# Patient Record
Sex: MALE | Race: WHITE | NOT HISPANIC OR LATINO
[De-identification: names, ages, dates, MRNs, and addresses within clinical notes are randomized per-mention and may not be internally consistent; named-entity substitution may affect disease eponyms.]

---

## 2017-01-03 ENCOUNTER — APPOINTMENT (OUTPATIENT)
Dept: CT IMAGING | Facility: CLINIC | Age: 57
End: 2017-01-03

## 2017-01-03 ENCOUNTER — OUTPATIENT (OUTPATIENT)
Dept: OUTPATIENT SERVICES | Facility: HOSPITAL | Age: 57
LOS: 1 days | End: 2017-01-03
Payer: COMMERCIAL

## 2017-01-03 DIAGNOSIS — Z00.8 ENCOUNTER FOR OTHER GENERAL EXAMINATION: ICD-10-CM

## 2017-01-03 PROCEDURE — 82565 ASSAY OF CREATININE: CPT

## 2017-01-03 PROCEDURE — 74178 CT ABD&PLV WO CNTR FLWD CNTR: CPT

## 2017-01-10 ENCOUNTER — APPOINTMENT (OUTPATIENT)
Dept: UROLOGY | Facility: CLINIC | Age: 57
End: 2017-01-10

## 2017-01-10 DIAGNOSIS — R30.0 DYSURIA: ICD-10-CM

## 2017-01-10 RX ORDER — TERAZOSIN 5 MG/1
5 CAPSULE ORAL
Qty: 90 | Refills: 3 | Status: ACTIVE | COMMUNITY
Start: 2017-01-10 | End: 1900-01-01

## 2017-01-17 ENCOUNTER — APPOINTMENT (OUTPATIENT)
Dept: UROLOGY | Facility: CLINIC | Age: 57
End: 2017-01-17

## 2017-01-17 ENCOUNTER — OUTPATIENT (OUTPATIENT)
Dept: OUTPATIENT SERVICES | Facility: HOSPITAL | Age: 57
LOS: 1 days | End: 2017-01-17
Payer: COMMERCIAL

## 2017-01-17 ENCOUNTER — APPOINTMENT (OUTPATIENT)
Dept: RADIOLOGY | Facility: CLINIC | Age: 57
End: 2017-01-17

## 2017-01-17 DIAGNOSIS — Z00.8 ENCOUNTER FOR OTHER GENERAL EXAMINATION: ICD-10-CM

## 2017-01-17 PROCEDURE — 74018 RADEX ABDOMEN 1 VIEW: CPT

## 2017-01-19 RX ORDER — ALLOPURINOL 100 MG/1
100 TABLET ORAL
Qty: 30 | Refills: 0 | Status: ACTIVE | COMMUNITY
Start: 2017-01-19 | End: 1900-01-01

## 2017-01-19 RX ORDER — POTASSIUM CITRATE AND CITRIC ACID 3.3; 1.002 G/1; G/1
3300-1002 GRANULE, FOR SOLUTION ORAL
Qty: 28 | Refills: 0 | Status: ACTIVE | COMMUNITY
Start: 2017-01-19 | End: 1900-01-01

## 2017-02-06 ENCOUNTER — APPOINTMENT (OUTPATIENT)
Dept: ULTRASOUND IMAGING | Facility: CLINIC | Age: 57
End: 2017-02-06

## 2017-02-06 ENCOUNTER — OUTPATIENT (OUTPATIENT)
Dept: OUTPATIENT SERVICES | Facility: HOSPITAL | Age: 57
LOS: 1 days | End: 2017-02-06
Payer: COMMERCIAL

## 2017-02-06 DIAGNOSIS — Z00.8 ENCOUNTER FOR OTHER GENERAL EXAMINATION: ICD-10-CM

## 2017-02-06 PROCEDURE — 76775 US EXAM ABDO BACK WALL LIM: CPT

## 2017-03-16 ENCOUNTER — APPOINTMENT (OUTPATIENT)
Dept: UROLOGY | Facility: CLINIC | Age: 57
End: 2017-03-16

## 2017-03-16 VITALS
SYSTOLIC BLOOD PRESSURE: 140 MMHG | HEIGHT: 72 IN | WEIGHT: 245 LBS | TEMPERATURE: 98 F | BODY MASS INDEX: 33.18 KG/M2 | OXYGEN SATURATION: 96 % | HEART RATE: 63 BPM | DIASTOLIC BLOOD PRESSURE: 79 MMHG

## 2017-09-21 ENCOUNTER — APPOINTMENT (OUTPATIENT)
Dept: CT IMAGING | Facility: CLINIC | Age: 57
End: 2017-09-21
Payer: COMMERCIAL

## 2017-09-21 ENCOUNTER — OUTPATIENT (OUTPATIENT)
Dept: OUTPATIENT SERVICES | Facility: HOSPITAL | Age: 57
LOS: 1 days | End: 2017-09-21
Payer: COMMERCIAL

## 2017-09-21 DIAGNOSIS — Z00.8 ENCOUNTER FOR OTHER GENERAL EXAMINATION: ICD-10-CM

## 2017-09-21 PROCEDURE — 74176 CT ABD & PELVIS W/O CONTRAST: CPT | Mod: 26

## 2017-09-21 PROCEDURE — 74176 CT ABD & PELVIS W/O CONTRAST: CPT

## 2018-01-15 ENCOUNTER — OUTPATIENT (OUTPATIENT)
Dept: OUTPATIENT SERVICES | Facility: HOSPITAL | Age: 58
LOS: 1 days | End: 2018-01-15
Payer: COMMERCIAL

## 2018-01-15 ENCOUNTER — APPOINTMENT (OUTPATIENT)
Dept: MRI IMAGING | Facility: CLINIC | Age: 58
End: 2018-01-15

## 2018-01-15 DIAGNOSIS — Z00.8 ENCOUNTER FOR OTHER GENERAL EXAMINATION: ICD-10-CM

## 2018-01-15 PROCEDURE — 73221 MRI JOINT UPR EXTREM W/O DYE: CPT

## 2018-01-15 PROCEDURE — 73221 MRI JOINT UPR EXTREM W/O DYE: CPT | Mod: 26,LT

## 2018-10-11 ENCOUNTER — OUTPATIENT (OUTPATIENT)
Dept: OUTPATIENT SERVICES | Facility: HOSPITAL | Age: 58
LOS: 1 days | End: 2018-10-11
Payer: COMMERCIAL

## 2018-10-11 ENCOUNTER — APPOINTMENT (OUTPATIENT)
Dept: CT IMAGING | Facility: CLINIC | Age: 58
End: 2018-10-11
Payer: COMMERCIAL

## 2018-10-11 DIAGNOSIS — Z00.8 ENCOUNTER FOR OTHER GENERAL EXAMINATION: ICD-10-CM

## 2018-10-11 PROCEDURE — 74176 CT ABD & PELVIS W/O CONTRAST: CPT | Mod: 26

## 2018-10-11 PROCEDURE — 74176 CT ABD & PELVIS W/O CONTRAST: CPT

## 2019-02-12 ENCOUNTER — APPOINTMENT (OUTPATIENT)
Dept: GASTROENTEROLOGY | Facility: CLINIC | Age: 59
End: 2019-02-12
Payer: COMMERCIAL

## 2019-02-12 VITALS
HEIGHT: 72 IN | TEMPERATURE: 97.9 F | OXYGEN SATURATION: 99 % | BODY MASS INDEX: 28.71 KG/M2 | SYSTOLIC BLOOD PRESSURE: 130 MMHG | WEIGHT: 212 LBS | HEART RATE: 53 BPM | RESPIRATION RATE: 16 BRPM | DIASTOLIC BLOOD PRESSURE: 66 MMHG

## 2019-02-12 DIAGNOSIS — K57.30 DIVERTICULOSIS OF LARGE INTESTINE W/OUT PERFORATION OR ABSCESS W/OUT BLEEDING: ICD-10-CM

## 2019-02-12 DIAGNOSIS — Z78.9 OTHER SPECIFIED HEALTH STATUS: ICD-10-CM

## 2019-02-12 DIAGNOSIS — R10.12 LEFT UPPER QUADRANT PAIN: ICD-10-CM

## 2019-02-12 PROCEDURE — 99244 OFF/OP CNSLTJ NEW/EST MOD 40: CPT

## 2019-02-12 RX ORDER — ALLOPURINOL 100 MG/1
100 TABLET ORAL DAILY
Qty: 90 | Refills: 3 | Status: DISCONTINUED | COMMUNITY
Start: 2017-02-21 | End: 2019-02-12

## 2019-02-12 NOTE — ASSESSMENT
[FreeTextEntry1] : 58-year-old male, left lower quadrant abdominal pains following bowel movements.\par Improvement with dicyclomine\par Improvement when he uses stool softener, MiraLax, following bowel cleanse associated with colonoscopy\par \par Functional abdominal pain, mild spasm likely\par \par Plan\par Above discussed in detail with patient.\par Expresses good understanding of my clinical impression\par No further diagnostic testing needed at this time\par Recommend MiraLax daily\par \par Patient referred by Dr Cameron Connell\par Follow up as needed

## 2019-02-12 NOTE — HISTORY OF PRESENT ILLNESS
[de-identified] : 58-year-old male presents for evaluation of intermittent left lower quadrant abdominal pains. Pain is typically following bowel movements, beginning 10 minutes later, often lasting throughout the day. Patient had recent colonoscopy with random biopsy showing only diverticulosis. Unremarkable CAT scan of the abdomen and pelvis reported. Patient reported therapeutic trial of Metamucil and Citrucel, each of which provided large well-formed bowel movements, but no significant improvement of his pain after defecation. Patient did notice significant improvement of his pain in the days following his bowel preparation for colonoscopy, and has generally had good results with using MiraLax every other day, though it does make his bowel movements narrower.  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

## 2019-02-12 NOTE — PHYSICAL EXAM
[General Appearance - Alert] : alert [General Appearance - In No Acute Distress] : in no acute distress [Sclera] : the sclera and conjunctiva were normal [PERRL With Normal Accommodation] : pupils were equal in size, round, and reactive to light [Extraocular Movements] : extraocular movements were intact [Outer Ear] : the ears and nose were normal in appearance [Oropharynx] : the oropharynx was normal [Neck Appearance] : the appearance of the neck was normal [Neck Cervical Mass (___cm)] : no neck mass was observed [Jugular Venous Distention Increased] : there was no jugular-venous distention [Thyroid Diffuse Enlargement] : the thyroid was not enlarged [Thyroid Nodule] : there were no palpable thyroid nodules [Auscultation Breath Sounds / Voice Sounds] : lungs were clear to auscultation bilaterally [Heart Rate And Rhythm] : heart rate was normal and rhythm regular [Heart Sounds] : normal S1 and S2 [Heart Sounds Gallop] : no gallops [Murmurs] : no murmurs [Heart Sounds Pericardial Friction Rub] : no pericardial rub [Edema] : there was no peripheral edema [Bowel Sounds] : normal bowel sounds [Abdomen Soft] : soft [Abdomen Tenderness] : non-tender [Abdomen Mass (___ Cm)] : no abdominal mass palpated [Cervical Lymph Nodes Enlarged Posterior Bilaterally] : posterior cervical [Cervical Lymph Nodes Enlarged Anterior Bilaterally] : anterior cervical [Supraclavicular Lymph Nodes Enlarged Bilaterally] : supraclavicular [Axillary Lymph Nodes Enlarged Bilaterally] : axillary [Femoral Lymph Nodes Enlarged Bilaterally] : femoral [Inguinal Lymph Nodes Enlarged Bilaterally] : inguinal [No CVA Tenderness] : no ~M costovertebral angle tenderness [No Spinal Tenderness] : no spinal tenderness [Abnormal Walk] : normal gait [Nail Clubbing] : no clubbing  or cyanosis of the fingernails [Musculoskeletal - Swelling] : no joint swelling seen [Motor Tone] : muscle strength and tone were normal [Skin Color & Pigmentation] : normal skin color and pigmentation [Skin Turgor] : normal skin turgor [] : no rash [Oriented To Time, Place, And Person] : oriented to person, place, and time [Impaired Insight] : insight and judgment were intact [Affect] : the affect was normal

## 2019-03-15 ENCOUNTER — APPOINTMENT (OUTPATIENT)
Age: 59
End: 2019-03-15
Payer: COMMERCIAL

## 2019-03-15 VITALS
WEIGHT: 212 LBS | BODY MASS INDEX: 28.71 KG/M2 | OXYGEN SATURATION: 98 % | HEIGHT: 72 IN | HEART RATE: 57 BPM | DIASTOLIC BLOOD PRESSURE: 80 MMHG | SYSTOLIC BLOOD PRESSURE: 130 MMHG

## 2019-03-15 DIAGNOSIS — N13.8 BENIGN PROSTATIC HYPERPLASIA WITH LOWER URINARY TRACT SYMPMS: ICD-10-CM

## 2019-03-15 DIAGNOSIS — N40.1 BENIGN PROSTATIC HYPERPLASIA WITH LOWER URINARY TRACT SYMPMS: ICD-10-CM

## 2019-03-15 DIAGNOSIS — Z87.442 PERSONAL HISTORY OF URINARY CALCULI: ICD-10-CM

## 2019-03-15 PROCEDURE — 99213 OFFICE O/P EST LOW 20 MIN: CPT

## 2019-03-15 RX ORDER — TERAZOSIN 5 MG/1
5 CAPSULE ORAL
Qty: 14 | Refills: 0 | Status: ACTIVE | OUTPATIENT
Start: 2019-03-15

## 2019-03-15 NOTE — END OF VISIT
[FreeTextEntry3] : He will trial terazosin and fall with a transrectal ultrasound and prostate gland. A KUB x-ray done to screen for cystoscopy

## 2019-03-15 NOTE — HISTORY OF PRESENT ILLNESS
[FreeTextEntry1] : This patient presents for regular checkup and has noted lower tract symptoms. He also has a history of urinary tract stones checked upon in some time.

## 2019-04-08 ENCOUNTER — APPOINTMENT (OUTPATIENT)
Dept: ULTRASOUND IMAGING | Facility: CLINIC | Age: 59
End: 2019-04-08

## 2019-04-08 ENCOUNTER — APPOINTMENT (OUTPATIENT)
Dept: CT IMAGING | Facility: CLINIC | Age: 59
End: 2019-04-08

## 2019-04-09 ENCOUNTER — APPOINTMENT (OUTPATIENT)
Dept: UROLOGY | Facility: CLINIC | Age: 59
End: 2019-04-09

## 2019-08-12 ENCOUNTER — APPOINTMENT (OUTPATIENT)
Dept: OTOLARYNGOLOGY | Facility: CLINIC | Age: 59
End: 2019-08-12
Payer: COMMERCIAL

## 2019-08-12 VITALS
HEART RATE: 52 BPM | HEIGHT: 72 IN | BODY MASS INDEX: 28.71 KG/M2 | SYSTOLIC BLOOD PRESSURE: 143 MMHG | WEIGHT: 212 LBS | DIASTOLIC BLOOD PRESSURE: 90 MMHG

## 2019-08-12 DIAGNOSIS — H90.3 SENSORINEURAL HEARING LOSS, BILATERAL: ICD-10-CM

## 2019-08-12 DIAGNOSIS — H61.813 EXOSTOSIS OF EXTERNAL CANAL, BILATERAL: ICD-10-CM

## 2019-08-12 DIAGNOSIS — J34.2 DEVIATED NASAL SEPTUM: ICD-10-CM

## 2019-08-12 DIAGNOSIS — H69.82 OTHER SPECIFIED DISORDERS OF EUSTACHIAN TUBE, LEFT EAR: ICD-10-CM

## 2019-08-12 DIAGNOSIS — H93.12 TINNITUS, LEFT EAR: ICD-10-CM

## 2019-08-12 PROCEDURE — 31231 NASAL ENDOSCOPY DX: CPT

## 2019-08-12 PROCEDURE — 92557 COMPREHENSIVE HEARING TEST: CPT

## 2019-08-12 PROCEDURE — 99204 OFFICE O/P NEW MOD 45 MIN: CPT | Mod: 25

## 2019-08-12 PROCEDURE — 92567 TYMPANOMETRY: CPT

## 2019-08-12 RX ORDER — AZILSARTAN KAMEDOXOMIL AND CHLORTHALIDONE 40; 25 MG/1; MG/1
40-25 TABLET ORAL
Refills: 0 | Status: ACTIVE | COMMUNITY

## 2019-08-12 NOTE — REVIEW OF SYSTEMS
[Ear Pain] : ear pain [Hearing Loss] : hearing loss [Ear Noises] : ear noises [Negative] : Heme/Lymph

## 2019-08-12 NOTE — PHYSICAL EXAM
[Midline] : trachea located in midline position [Normal] : orientation to person, place, and time: normal [Nasal Endoscopy Performed] : nasal endoscopy was performed, see procedure section for findings [] : septum deviated to the left [de-identified] : exostoses bilaterally

## 2019-08-12 NOTE — CONSULT LETTER
[Dear  ___] : Dear  [unfilled], [Consult Letter:] : I had the pleasure of evaluating your patient, [unfilled]. [Please see my note below.] : Please see my note below. [Consult Closing:] : Thank you very much for allowing me to participate in the care of this patient.  If you have any questions, please do not hesitate to contact me. [Sincerely,] : Sincerely, [FreeTextEntry3] : Tani Mayer MD\par [facility]\par Otolaryngology and Facial Plastics\par [employer], [employer]\par

## 2019-08-12 NOTE — HISTORY OF PRESENT ILLNESS
[de-identified] : PAtient has had a dull ache in the left ear that is constant but a minor pain that he can tolerate. HE admits that when he presses the tragus he has a loud crack that instantly goes away and sometimes if he holds pressure he has a constant crackling. He does admit that he has issues with his hearing in the left ear for awhile as well. He does not have any dizziness or ringing in the ears. He admits when the wind makes the pain worse. He tried drops in the left ear as well and when he tried that it did not help.

## 2019-08-12 NOTE — ASSESSMENT
[FreeTextEntry1] : Patient with nondescript symptoms of his left ear occasional tinnitus occasional pressure occasional crackling here for evaluation and examination yearly exam is normal except for evidence of exostosis in his left external auditory canal as well as in his right external auditory canal. Nasal endoscopy shows a severely deviated septum in his left nasal cavity but no obstruction of his eustachian tube orifice. He does not have any symptoms consistent with allergies. We discussed the pros and cons and the possibilities of his symptoms and most likely this is a combination of eustachian tube dysfunction as well as nasal tip postnasal drip. Premarin Flonase nasal spray of audiogram confirm essentially normal hearing will follow up and see us as needed

## 2019-08-20 ENCOUNTER — APPOINTMENT (OUTPATIENT)
Dept: OTOLARYNGOLOGY | Facility: CLINIC | Age: 59
End: 2019-08-20

## 2019-10-10 RX ORDER — FLUTICASONE PROPIONATE 50 UG/1
50 SPRAY, METERED NASAL DAILY
Qty: 1 | Refills: 2 | Status: ACTIVE | COMMUNITY
Start: 2019-08-12 | End: 1900-01-01

## 2021-02-25 ENCOUNTER — OUTPATIENT (OUTPATIENT)
Dept: OUTPATIENT SERVICES | Facility: HOSPITAL | Age: 61
LOS: 1 days | End: 2021-02-25
Payer: COMMERCIAL

## 2021-02-25 DIAGNOSIS — Z20.828 CONTACT WITH AND (SUSPECTED) EXPOSURE TO OTHER VIRAL COMMUNICABLE DISEASES: ICD-10-CM

## 2021-02-25 LAB — SARS-COV-2 RNA SPEC QL NAA+PROBE: SIGNIFICANT CHANGE UP

## 2021-02-25 PROCEDURE — U0005: CPT

## 2021-02-25 PROCEDURE — C9803: CPT

## 2021-02-25 PROCEDURE — U0003: CPT

## 2021-02-26 DIAGNOSIS — Z20.828 CONTACT WITH AND (SUSPECTED) EXPOSURE TO OTHER VIRAL COMMUNICABLE DISEASES: ICD-10-CM

## 2022-01-04 ENCOUNTER — APPOINTMENT (OUTPATIENT)
Dept: UROLOGY | Facility: CLINIC | Age: 62
End: 2022-01-04

## 2022-06-29 ENCOUNTER — EMERGENCY (EMERGENCY)
Facility: HOSPITAL | Age: 62
LOS: 0 days | Discharge: HOME | End: 2022-06-30
Attending: EMERGENCY MEDICINE
Payer: COMMERCIAL

## 2022-06-29 VITALS
DIASTOLIC BLOOD PRESSURE: 88 MMHG | SYSTOLIC BLOOD PRESSURE: 166 MMHG | TEMPERATURE: 98 F | HEART RATE: 59 BPM | OXYGEN SATURATION: 96 % | RESPIRATION RATE: 18 BRPM

## 2022-06-29 VITALS
HEIGHT: 72 IN | TEMPERATURE: 98 F | WEIGHT: 227.96 LBS | SYSTOLIC BLOOD PRESSURE: 156 MMHG | OXYGEN SATURATION: 96 % | HEART RATE: 80 BPM | RESPIRATION RATE: 18 BRPM | DIASTOLIC BLOOD PRESSURE: 106 MMHG

## 2022-06-29 DIAGNOSIS — I10 ESSENTIAL (PRIMARY) HYPERTENSION: ICD-10-CM

## 2022-06-29 DIAGNOSIS — M79.651 PAIN IN RIGHT THIGH: ICD-10-CM

## 2022-06-29 DIAGNOSIS — R20.0 ANESTHESIA OF SKIN: ICD-10-CM

## 2022-06-29 DIAGNOSIS — M51.16 INTERVERTEBRAL DISC DISORDERS WITH RADICULOPATHY, LUMBAR REGION: ICD-10-CM

## 2022-06-29 DIAGNOSIS — M54.50 LOW BACK PAIN, UNSPECIFIED: ICD-10-CM

## 2022-06-29 LAB
ALBUMIN SERPL ELPH-MCNC: 3.9 G/DL — SIGNIFICANT CHANGE UP (ref 3.3–5)
ALP SERPL-CCNC: 69 U/L — SIGNIFICANT CHANGE UP (ref 40–120)
ALT FLD-CCNC: 30 U/L — SIGNIFICANT CHANGE UP (ref 12–78)
ANION GAP SERPL CALC-SCNC: 5 MMOL/L — SIGNIFICANT CHANGE UP (ref 5–17)
AST SERPL-CCNC: 32 U/L — SIGNIFICANT CHANGE UP (ref 15–37)
BASOPHILS # BLD AUTO: 0.01 K/UL — SIGNIFICANT CHANGE UP (ref 0–0.2)
BASOPHILS NFR BLD AUTO: 0.2 % — SIGNIFICANT CHANGE UP (ref 0–2)
BILIRUB SERPL-MCNC: 0.7 MG/DL — SIGNIFICANT CHANGE UP (ref 0.2–1.2)
BUN SERPL-MCNC: 17 MG/DL — SIGNIFICANT CHANGE UP (ref 7–23)
CALCIUM SERPL-MCNC: 9.2 MG/DL — SIGNIFICANT CHANGE UP (ref 8.5–10.1)
CHLORIDE SERPL-SCNC: 105 MMOL/L — SIGNIFICANT CHANGE UP (ref 96–108)
CO2 SERPL-SCNC: 29 MMOL/L — SIGNIFICANT CHANGE UP (ref 22–31)
CREAT SERPL-MCNC: 1.05 MG/DL — SIGNIFICANT CHANGE UP (ref 0.5–1.3)
EGFR: 82 ML/MIN/1.73M2 — SIGNIFICANT CHANGE UP
EOSINOPHIL # BLD AUTO: 0.08 K/UL — SIGNIFICANT CHANGE UP (ref 0–0.5)
EOSINOPHIL NFR BLD AUTO: 1.3 % — SIGNIFICANT CHANGE UP (ref 0–6)
GLUCOSE SERPL-MCNC: 167 MG/DL — HIGH (ref 70–99)
HCT VFR BLD CALC: 46.5 % — SIGNIFICANT CHANGE UP (ref 39–50)
HGB BLD-MCNC: 16.7 G/DL — SIGNIFICANT CHANGE UP (ref 13–17)
IMM GRANULOCYTES NFR BLD AUTO: 0.5 % — SIGNIFICANT CHANGE UP (ref 0–1.5)
LYMPHOCYTES # BLD AUTO: 1.36 K/UL — SIGNIFICANT CHANGE UP (ref 1–3.3)
LYMPHOCYTES # BLD AUTO: 22.8 % — SIGNIFICANT CHANGE UP (ref 13–44)
MCHC RBC-ENTMCNC: 30.1 PG — SIGNIFICANT CHANGE UP (ref 27–34)
MCHC RBC-ENTMCNC: 35.9 GM/DL — SIGNIFICANT CHANGE UP (ref 32–36)
MCV RBC AUTO: 83.9 FL — SIGNIFICANT CHANGE UP (ref 80–100)
MONOCYTES # BLD AUTO: 0.36 K/UL — SIGNIFICANT CHANGE UP (ref 0–0.9)
MONOCYTES NFR BLD AUTO: 6 % — SIGNIFICANT CHANGE UP (ref 2–14)
NEUTROPHILS # BLD AUTO: 4.13 K/UL — SIGNIFICANT CHANGE UP (ref 1.8–7.4)
NEUTROPHILS NFR BLD AUTO: 69.2 % — SIGNIFICANT CHANGE UP (ref 43–77)
PLATELET # BLD AUTO: 172 K/UL — SIGNIFICANT CHANGE UP (ref 150–400)
POTASSIUM SERPL-MCNC: 3.3 MMOL/L — LOW (ref 3.5–5.3)
POTASSIUM SERPL-SCNC: 3.3 MMOL/L — LOW (ref 3.5–5.3)
PROT SERPL-MCNC: 7.1 GM/DL — SIGNIFICANT CHANGE UP (ref 6–8.3)
RBC # BLD: 5.54 M/UL — SIGNIFICANT CHANGE UP (ref 4.2–5.8)
RBC # FLD: 12.9 % — SIGNIFICANT CHANGE UP (ref 10.3–14.5)
SODIUM SERPL-SCNC: 139 MMOL/L — SIGNIFICANT CHANGE UP (ref 135–145)
TROPONIN I, HIGH SENSITIVITY RESULT: 8.91 NG/L — SIGNIFICANT CHANGE UP
WBC # BLD: 5.97 K/UL — SIGNIFICANT CHANGE UP (ref 3.8–10.5)
WBC # FLD AUTO: 5.97 K/UL — SIGNIFICANT CHANGE UP (ref 3.8–10.5)

## 2022-06-29 PROCEDURE — 80053 COMPREHEN METABOLIC PANEL: CPT

## 2022-06-29 PROCEDURE — 36415 COLL VENOUS BLD VENIPUNCTURE: CPT

## 2022-06-29 PROCEDURE — 93010 ELECTROCARDIOGRAM REPORT: CPT

## 2022-06-29 PROCEDURE — 99284 EMERGENCY DEPT VISIT MOD MDM: CPT | Mod: 25

## 2022-06-29 PROCEDURE — 96375 TX/PRO/DX INJ NEW DRUG ADDON: CPT

## 2022-06-29 PROCEDURE — 84484 ASSAY OF TROPONIN QUANT: CPT

## 2022-06-29 PROCEDURE — 93005 ELECTROCARDIOGRAM TRACING: CPT

## 2022-06-29 PROCEDURE — 85025 COMPLETE CBC W/AUTO DIFF WBC: CPT

## 2022-06-29 PROCEDURE — 96374 THER/PROPH/DIAG INJ IV PUSH: CPT

## 2022-06-29 PROCEDURE — 99285 EMERGENCY DEPT VISIT HI MDM: CPT

## 2022-06-29 PROCEDURE — 81001 URINALYSIS AUTO W/SCOPE: CPT

## 2022-06-29 RX ORDER — MORPHINE SULFATE 50 MG/1
4 CAPSULE, EXTENDED RELEASE ORAL ONCE
Refills: 0 | Status: DISCONTINUED | OUTPATIENT
Start: 2022-06-29 | End: 2022-06-29

## 2022-06-29 RX ORDER — DEXAMETHASONE 0.5 MG/5ML
10 ELIXIR ORAL ONCE
Refills: 0 | Status: COMPLETED | OUTPATIENT
Start: 2022-06-29 | End: 2022-06-29

## 2022-06-29 RX ORDER — AMLODIPINE BESYLATE 2.5 MG/1
10 TABLET ORAL ONCE
Refills: 0 | Status: COMPLETED | OUTPATIENT
Start: 2022-06-29 | End: 2022-06-29

## 2022-06-29 RX ORDER — OXYCODONE AND ACETAMINOPHEN 5; 325 MG/1; MG/1
1 TABLET ORAL
Qty: 12 | Refills: 0
Start: 2022-06-29

## 2022-06-29 RX ORDER — AMLODIPINE BESYLATE 2.5 MG/1
1 TABLET ORAL
Qty: 14 | Refills: 0
Start: 2022-06-29 | End: 2022-07-12

## 2022-06-29 RX ORDER — LIDOCAINE 4 G/100G
1 CREAM TOPICAL ONCE
Refills: 0 | Status: COMPLETED | OUTPATIENT
Start: 2022-06-29 | End: 2022-06-29

## 2022-06-29 RX ORDER — KETOROLAC TROMETHAMINE 30 MG/ML
30 SYRINGE (ML) INJECTION ONCE
Refills: 0 | Status: DISCONTINUED | OUTPATIENT
Start: 2022-06-29 | End: 2022-06-29

## 2022-06-29 RX ADMIN — Medication 102 MILLIGRAM(S): at 22:28

## 2022-06-29 RX ADMIN — LIDOCAINE 1 PATCH: 4 CREAM TOPICAL at 22:29

## 2022-06-29 RX ADMIN — Medication 30 MILLIGRAM(S): at 22:30

## 2022-06-29 RX ADMIN — MORPHINE SULFATE 4 MILLIGRAM(S): 50 CAPSULE, EXTENDED RELEASE ORAL at 23:17

## 2022-06-29 RX ADMIN — AMLODIPINE BESYLATE 10 MILLIGRAM(S): 2.5 TABLET ORAL at 22:32

## 2022-06-29 NOTE — ED STATDOCS - CLINICAL SUMMARY MEDICAL DECISION MAKING FREE TEXT BOX
61 year old with lumbar radiculitis 2/2 lumbar disc herniations Plan: labs, urine, pain control, EKG. Return to PMD's office for FU blood pressure in 1-2 days.

## 2022-06-29 NOTE — ED ADULT TRIAGE NOTE - CHIEF COMPLAINT QUOTE
sciatica pain, right buttocks and leg pain. was supposed to get epidural today, was hypertensive 200/121 approx. 15 minutes ago. pt states he was referred to ED by friend who is a doctor. denies visual changes, endorsing head tightness. denies CP, SOB. pmhx HTN. /106 in triage.

## 2022-06-29 NOTE — ED STATDOCS - OBJECTIVE STATEMENT
62 yo male HTN on Coreg, h/o herniated discs in L-spine presents to the ED with two weeks of right sided low back pain radiating to right thigh with associated mild numbness. Seen by chiropractor, had outpatient MRI which showed disc herniations and opted to do epidural injections first. When he went to get the injections today, his BP was too high so they canceled the shots. His PCP Dr. Vick's office picked him up and was placed on Diltiazem. Pt BP today in office was 200/120. Denies headache, dizziness, abdominal pain. No other injuries or complaints.

## 2022-06-29 NOTE — ED STATDOCS - CARE PLAN
1 Principal Discharge DX:	Lumbar radiculopathy  Secondary Diagnosis:	Lumbar herniated disc  Secondary Diagnosis:	Hypertension

## 2022-06-29 NOTE — ED STATDOCS - CARE PROVIDER_API CALL
Yosvany Perez; PhD)  Neurosurgery  [address] [facility] [address] [place] [address]  Phone: [phone]  Fax: [phone]  Follow Up Time:

## 2022-06-29 NOTE — ED STATDOCS - PROGRESS NOTE DETAILS
BP improved to 160/80.  Pain controlled.  Pain meds sent to pharmacy for back pain.  Ortho spine/neurosurgery referral given.  Will have patient repeat BP in Dr. Samuels office in 1-2 days.

## 2022-06-29 NOTE — ED STATDOCS - NSFOLLOWUPINSTRUCTIONS_ED_ALL_ED_FT
pain meds from pharmacy.   See ortho spine or neurosurgery specialist.  Referral given.   Continue your regular blood pressure meds.  Stop diltiazem and as Dr. Vick if he wants you on both a beta blocker and calcium channel blocker. Start amlodipine and take daily for elevated blood pressure.                                                                              Lumbosacral Radiculopathy      Lumbosacral radiculopathy is a condition that involves the spinal nerves and nerve roots in the low back and bottom of the spine. The condition develops when these nerves and nerve roots move out of place or become inflamed and cause symptoms.      What are the causes?    This condition may be caused by:  •Pressure from a disk that bulges out of place (herniated disk). A disk is a plate of soft cartilage that separates bones in the spine.      •Disk changes that occur with age (disk degeneration).      •A narrowing of the bones of the lower back (spinal stenosis).      •A tumor.      •An infection.      •An injury that places sudden pressure on the disks that cushion the bones of your lower spine.        What increases the risk?    You are more likely to develop this condition if:  •You are a male who is 30–50 years old.      •You are a female who is 50–60 years old.      •You use improper technique when lifting things.      •You are overweight or live a sedentary lifestyle.      •You smoke.      •Your work requires frequent lifting.      •You do repetitive activities that strain the spine.        What are the signs or symptoms?     Symptoms of this condition include:  •Pain that goes down from your back into your legs (sciatica), usually on one side of the body. This is the most common symptom. The pain may be worse with sitting, coughing, or sneezing.      •Pain and numbness in your legs.      •Muscle weakness.      •Tingling.      •Loss of bladder control or bowel control.        How is this diagnosed?    This condition may be diagnosed based on:  •Your symptoms and medical history.      •A physical exam.      If the pain is lasting, you may have tests, such as:  •MRI scan.      •X-ray.      •CT scan.      •A type of X-ray used to examine the spinal canal after injecting a dye into your spine (myelogram).      •A test to measure how electrical impulses move through a nerve (nerve conduction study).        How is this treated?    Treatment may depend on the cause of the condition and may include:  •Working with a physical therapist.      •Taking pain medicine.      •Applying heat and ice to affected areas.      •Doing stretches to improve flexibility.      •Doing exercises to strengthen back muscles.      •Having chiropractic spinal manipulation.      •Using transcutaneous electrical nerve stimulation (TENS) therapy.      •Getting a steroid injection in the spine.      In some cases, no treatment is needed. If the condition is long-lasting (chronic), or if symptoms are severe, treatment may involve surgery or lifestyle changes, such as following a weight-loss plan.      Follow these instructions at home:    Activity     •Avoid bending and other activities that make the problem worse.    •Maintain a proper position when standing or sitting:   •When standing, keep your upper back and neck straight, with your shoulders pulled back. Avoid slouching.       •When sitting, keep your back straight and relax your shoulders. Do not round your shoulders or pull them backward.        • Do not sit or  one place for long periods of time.      •Take brief periods of rest throughout the day. This will reduce your pain. It is usually better to rest by lying down or standing, not sitting.      •When you are resting for longer periods, mix in some mild activity or stretching between periods of rest. This will help to prevent stiffness and pain.       •Get regular exercise. Ask your health care provider what activities are safe for you. If you were shown how to do any exercises or stretches, do them as directed by your health care provider.    • Do not lift anything that is heavier than 10 lb (4.5 kg) or the limit that you are told by your health care provider. Always use proper lifting technique, which includes:  •Bending your knees.       •Keeping the load close to your body.       •Avoiding twisting.         Managing pain   •If directed, put ice on the affected area:  •Put ice in a plastic bag.       •Place a towel between your skin and the bag.      •Leave the ice on for 20 minutes, 2–3 times a day.      •If directed, apply heat to the affected area as often as told by your health care provider. Use the heat source that your health care provider recommends, such as a moist heat pack or a heating pad.  •Place a towel between your skin and the heat source.       •Leave the heat on for 20–30 minutes.       •Remove the heat if your skin turns bright red. This is especially important if you are unable to feel pain, heat, or cold. You may have a greater risk of getting burned.        •Take over-the-counter and prescription medicines only as told by your health care provider.      General instructions     •Sleep on a firm mattress in a comfortable position. Try lying on your side with your knees slightly bent. If you lie on your back, put a pillow under your knees.      • Do not drive or use heavy machinery while taking prescription pain medicine.      •If your health care provider prescribed a diet or exercise program, follow it as directed.      •Keep all follow-up visits as told by your health care provider. This is important.        Contact a health care provider if:    •Your pain does not improve over time, even when taking pain medicines.        Get help right away if:    •You develop severe pain.      •Your pain suddenly gets worse.      •You develop increasing weakness in your legs.      •You lose the ability to control your bladder or bowel.      •You have difficulty walking or balancing.      •You have a fever.        Summary    •Lumbosacral radiculopathy is a condition that occurs when the spinal nerves and nerve roots in the lower part of the spine move out of place or become inflamed and cause symptoms.      •Symptoms include pain, numbness, and tingling that go down from your back into your legs (sciatica), muscle weakness, and loss of bladder control or bowel control.      •If directed, apply ice or heat to the affected area as told by your health care provider.      •Follow instructions about activity, rest, and proper lifting technique.      This information is not intended to replace advice given to you by your health care provider. Make sure you discuss any questions you have with your health care provider.      Document Revised: 10/25/2021 Document Reviewed: 10/28/2021    [employer] Patient Education © 2022 [employer]                                                                                                                Herniated Disk       A herniated disk happens when a disk in the spine bulges out too far. There is an oval disk between each pair of bones (vertebrae) in the backbone or spine. The disks connect the bones, help the spine move, and keep the bones from rubbing against each other when you move.    A herniated disk can happen anywhere in the back or neck area. It most often affects the lower back.      What are the causes?    This condition may be caused by:  •Wear and tear as you age.      •Sudden injury, such as a strain or sprain.        What increases the risk?    The following factors may make you more likely to develop this condition:  •Age. The older you are the higher the risk.      •Being a man who is 30–50 years old.      •Doing activities that involve heavy lifting, bending, or twisting.      •Not getting enough exercise.      •Being overweight.      •Smoking or using tobacco.        What are the signs or symptoms?    Symptoms may vary depending on where the herniated disk is in your body.  •Sharp pain in the arm, hip, butt, or in the lower back. The pain can spread to the leg and foot.      •Dizziness.      • A feeling that things are moving when they are not (vertigo).       •Pain or weakness in the neck, shoulder, upper or lower arm, or fingers.    •Muscle weakness. You may not be able to:  •Lift your arm or leg.      •Stand on your toes.      •Squeeze with your hands.         •Loss of feeling (numbness) or tingling in the hands, arms, feet, or legs.      •Being unable to control when to poop or pee. This is rare but serious.        How is this treated?    This condition may be treated with:•Resting for a few days or several weeks.   •Do not do things that need a lot of effort. Do not go into complete bed rest. Do only light activities.      •If you have a herniated disk in your lower back, limit how much you sit. Sitting puts more pressure on the disk.        •Medicines for pain, swelling, or tense muscles.      •Ice or heat therapy.      •Steroid shots. These can reduce pain and swelling.      •Physical therapy to strengthen your back.        Follow these instructions at home:    Medicines     •Take over-the-counter and prescription medicines only as told by your doctor.    •If told, take steps to prevent problems with pooping (constipation). You may need to:   •Drink enough fluid to keep your pee (urine) pale yellow.      •Take over-the-counter or prescription medicines.      •Eat foods that are high in fiber. These include beans, whole grains, and fresh fruits and vegetables.      •Limit foods that are high in fat and processed sugars. These include fried or sweet foods.        •Ask your doctor if you should avoid driving or using machines while you are taking your medicines.        Managing pain, stiffness, and swelling                 •If told, put ice on the affected area. To do this:  •Put ice in a plastic bag.      •Place a towel between your skin and the bag.      •Leave the ice on for 20 minutes, 2–3 times a day.      •If told, put heat on the painful area. Use the heat source that your doctor recommends, such as a moist heat pack or a heating pad.  •Place a towel between your skin and the heat source.      •Leave the heat on for 20–30 minutes.        Take off the heat or ice if your skin turns bright red. If you cannot feel pain, heat, or cold, you have a greater risk of getting burned.    Activity     •Rest as told by your doctor. Avoid strict bed rest. Do only activities that do not cause pain.    •After your rest period:  •Return to your normal activities as told by your doctor. Slowly start doing exercises as told. Ask your doctor what activities and exercises are safe for you.      •Use good posture.      •Avoid movements that cause pain.      •Do not lift anything that is heavier than 10 lb (4.5 kg), or the limit that you are told.      •Do not sit or stand for a long time without moving.      •Do not sit for a long time without getting up and moving around.        •If exercises (physical therapy) were prescribed, do them as told by your doctor.      •Try to strengthen your back and belly with exercises such as swimming or walking.      General instructions     • Do not smoke or use any products that contain nicotine or tobacco. If you need help quitting, ask your doctor.      • Do not wear high-heeled shoes.      • Do not sleep on your belly.      •If you are overweight, work with your doctor to lose weight safely.      •Keep all follow-up visits.        How is this prevented?    •Stay at a healthy weight.      •Stay in shape. Do at least 150 minutes of moderate-intensity exercise each week, such as fast walking or water aerobics.    •When lifting objects:  •Keep your feet as far apart as your shoulders or farther apart.      •Tighten your belly muscles.      •Bend your knees and hips, and keep your spine neutral. Lift using the strength of your legs, not your back. Do not lock your knees straight out.      •Always ask for help to lift heavy or large objects.          Contact a doctor if:    •You have back pain or neck pain that does not get better after 6 weeks.      •You have very bad pain in your back, neck, legs, or arms.    •You get any of these problems in any part of your body:  •Tingling.      •Weakness.      •Loss of feeling.          Get help right away if:    •You cannot move your arms or legs.      •You cannot control when you pee or poop.      •You feel dizzy.      •You faint.      •You have trouble breathing.      These symptoms may be an emergency. Get help right away. Call your local emergency services (911 in the [place]    • Do not wait to see if the symptoms will go away.       • Do not drive yourself to the hospital.         Summary    •A herniated disk happens when a disk in your backbone bulges out too far.      •This condition may be caused by wear and tear as you age or a sudden injury.      •Symptoms may vary depending on where your herniated disk is in your body.      •Treatment may include rest, medicines, ice or heat therapy, steroid shots, and exercises.      This information is not intended to replace advice given to you by your health care provider. Make sure you discuss any questions you have with your health care provider.      Document Revised: 04/07/2021 Document Reviewed: 04/07/2021    [employer] Patient Education © 2022 [employer]    Hypertension    WHAT YOU NEED TO KNOW:    Hypertension is high blood pressure. Your blood pressure is the force of your blood moving against the walls of your arteries. Hypertension causes your blood pressure to get so high that your heart has to work much harder than normal. This can damage your heart. The cause of hypertension may not be known. This is called essential or primary hypertension. Hypertension caused by another medical condition, such as kidney disease, is called secondary hypertension.    DISCHARGE INSTRUCTIONS:    Call 911 for any of the following:     You have chest pain.      You have any of the following signs of a heart attack:   Squeezing, pressure, or pain in your chest       and any of the following:   Discomfort or pain in your back, neck, jaw, stomach, or arm       Shortness of breath      Nausea or vomiting      Lightheadedness or a sudden cold sweat      You become confused or have difficulty speaking.      You suddenly feel lightheaded or have trouble breathing.    Return to the emergency department if:     You have a severe headache or vision loss.      You have weakness in an arm or leg.     Contact your healthcare provider if:     You feel faint, dizzy, confused, or drowsy.       You have been taking your blood pressure medicine but your pressure is higher than your provider says it should be.      You have questions or concerns about your condition or care.    Medicines: You may need any of the following:     Antihypertensives may be used to help lower your blood pressure. Several kinds of medicines are available. Your healthcare provider will choose medicines based on the kind of hypertension you have. You may need more than one type of medicine. Take the medicine exactly as directed.       Diuretics help decrease extra fluid that collects in your body. This will help lower your blood pressure. You may urinate more often while you take this medicine.      Cholesterol medicine helps lower your cholesterol level. A low cholesterol level helps prevent heart disease and makes it easier to control your blood pressure.       Take your medicine as directed. Contact your healthcare provider if you think your medicine is not helping or if you have side effects. Tell him or her if you are allergic to any medicine. Keep a list of the medicines, vitamins, and herbs you take. Include the amounts, and when and why you take them. Bring the list or the pill bottles to follow-up visits. Carry your medicine list with you in case of an emergency.    Follow up with your healthcare provider as directed: You will need to return to have your blood pressure checked and to have other lab tests done. Write down your questions so you remember to ask them during your visits.     Stages of hypertension: Blood Pressure Readings         Normal blood pressure is 119/79 or lower. Your healthcare provider may only check your blood pressure each year if it stays at a normal level.      Elevated blood pressure is 120/79 to 129/79. This is sometimes called prehypertension. Your healthcare provider may suggest lifestyle changes to help lower your blood pressure to a normal level. He or she may then check it again in 3 to 6 months.      Stage 1 hypertension is 130/80 to 139/89. Your provider may recommend lifestyle changes, medication, and checks every 3 to 6 months until your blood pressure is controlled.      Stage 2 hypertension is 140/90 or higher. Your provider will recommend lifestyle changes and have you take 2 kinds of hypertension medicines. You will also need to have your blood pressure checked monthly until it is controlled.    Manage hypertension:     Check your blood pressure at home. Avoid smoking, caffeine, and exercise at least 30 minutes before checking your blood pressure. Sit and rest for 5 minutes before you take your blood pressure. Extend your arm and support it on a flat surface. Your arm should be at the same level as your heart. Follow the directions that came with your blood pressure monitor. Check your blood pressure 2 times, 1 minute apart, before you take your medicine in the morning. Also check your blood pressure before your evening meal. Keep a record of your readings and bring it to your follow-up visits. Ask your healthcare provider what your blood pressure should be. How to take a Blood Pressure           Manage any other health conditions you have. Health conditions such as diabetes can increase your risk for hypertension. Follow your healthcare provider's instructions and take all your medicines as directed.       Ask about all medicines. Certain medicines can increase your blood pressure. Examples include oral birth control pills, decongestants, herbal supplements, and NSAIDs, such as ibuprofen. Your healthcare provider can tell you which medicines are safe for you to take. This includes prescription and over-the-counter medicines.    Lifestyle changes you can make to manage hypertension:     Limit sodium (salt) as directed. Too much sodium can affect your fluid balance. Check labels to find low-sodium or no-salt-added foods. Some low-sodium foods use potassium salts for flavor. Too much potassium can also cause health problems. Your healthcare provider will tell you how much sodium and potassium are safe for you to have in a day. He or she may recommend that you limit sodium to 2,300 mg a day.           Follow the meal plan recommended by your healthcare provider. A dietitian or your provider can give you more information on low-sodium plans or the DASH (Dietary Approaches to Stop Hypertension) eating plan. The DASH plan is low in sodium, unhealthy fats, and total fat. It is high in potassium, calcium, and fiber.            Exercise to maintain a healthy weight. Exercise at least 30 minutes per day, on most days of the week. This will help decrease your blood pressure. Ask your healthcare provider about the best exercise plan for you. Walking for Exercise           Decrease stress. This may help lower your blood pressure. Learn ways to relax, such as deep breathing or listening to music.      Limit alcohol as directed. Alcohol can increase your blood pressure. A drink of alcohol is 12 ounces of beer, 5 ounces of wine, or 1½ ounces of liquor.      Do not smoke. Nicotine and other chemicals in cigarettes and cigars can increase your blood pressure and also cause lung damage. Ask your healthcare provider for information if you currently smoke and need help to quit. E-cigarettes or smokeless tobacco still contain nicotine. Talk to your healthcare provider before you use these products.

## 2022-06-29 NOTE — ED STATDOCS - PATIENT PORTAL LINK FT
You can access the FollowMyHealth Patient Portal offered by [facility] by registering at the following website: [email or web address]. By joining [employer]’s FollowMyHealth portal, you will also be able to view your health information using other applications (apps) compatible with our system.

## 2022-06-30 LAB
APPEARANCE UR: CLEAR — SIGNIFICANT CHANGE UP
BILIRUB UR-MCNC: NEGATIVE — SIGNIFICANT CHANGE UP
COLOR SPEC: YELLOW — SIGNIFICANT CHANGE UP
DIFF PNL FLD: ABNORMAL
GLUCOSE UR QL: NEGATIVE — SIGNIFICANT CHANGE UP
KETONES UR-MCNC: ABNORMAL
LEUKOCYTE ESTERASE UR-ACNC: NEGATIVE — SIGNIFICANT CHANGE UP
NITRITE UR-MCNC: NEGATIVE — SIGNIFICANT CHANGE UP
PH UR: 5 — SIGNIFICANT CHANGE UP (ref 5–8)
PROT UR-MCNC: 100
SP GR SPEC: 1.02 — SIGNIFICANT CHANGE UP (ref 1.01–1.02)
UROBILINOGEN FLD QL: NEGATIVE — SIGNIFICANT CHANGE UP

## 2022-08-09 PROBLEM — I10 ESSENTIAL (PRIMARY) HYPERTENSION: Chronic | Status: ACTIVE | Noted: 2022-06-30

## 2022-08-11 ENCOUNTER — APPOINTMENT (OUTPATIENT)
Dept: CT IMAGING | Facility: CLINIC | Age: 62
End: 2022-08-11

## 2023-05-11 ENCOUNTER — APPOINTMENT (OUTPATIENT)
Dept: ORTHOPEDIC SURGERY | Facility: CLINIC | Age: 63
End: 2023-05-11
Payer: COMMERCIAL

## 2023-05-11 VITALS — WEIGHT: 230 LBS | HEIGHT: 72 IN | BODY MASS INDEX: 31.15 KG/M2

## 2023-05-11 DIAGNOSIS — I15.9 SECONDARY HYPERTENSION, UNSPECIFIED: ICD-10-CM

## 2023-05-11 PROCEDURE — 99203 OFFICE O/P NEW LOW 30 MIN: CPT

## 2023-05-11 PROCEDURE — 73564 X-RAY EXAM KNEE 4 OR MORE: CPT | Mod: RT

## 2023-05-11 NOTE — ASSESSMENT
[FreeTextEntry1] : The patient is a 61 yo man presenting with right knee pain over the past 6 weeks. He reports an increase in pain while golfing that has been consistent since. He uses a brace for athletic activities. He denies specific trauma. He denies paresthesias or numbness. The patient denies pain with ADLs such as walking but does have pain with stairs. He does have pain with golfing and exercise movements. He had an MRI on 5/2/23 and received a CSI for the right knee with no relief. He denies locking or buckling epsiodes. \par \par \par Right knee exam: Well appearing male in no apparent distress. No rashes, scars, or abrasions. Neurovascularly intact. Nontender to palpation. Ranging from 0-125. No deformity. Negative anterior/posterior drawer, - Mcmurrays. - Lachmans. Screening exam of the right hip shows a full, painless ROM.\par \par \par MRI from stand up MRI of right knee on 5/2/23: Medial and lateral meniscus tears. Mild OA. No fractures. No tumors, masses or lesions. \par \par Xrays of right knee in office today: WB - Mild OA. No fractures or loose bodies. No masses, lesions, or tumors. \par

## 2023-05-11 NOTE — HISTORY OF PRESENT ILLNESS
[Gradual] : gradual [6] : 6 [0] : 0 [Dull/Aching] : dull/aching [Sharp] : sharp [Frequent] : frequent [Ice] : ice [Heat] : heat [Standing] : standing [Full time] : Work status: full time [] : no [FreeTextEntry1] : right knee [FreeTextEntry3] : 4/12/2023 [FreeTextEntry5] : Pt states he began experiencing right knee pain in early April. He denies any trauma. States it occasionally bothers him when standing for long periods of time.  [de-identified] : 5/4/23 [de-identified] : Dr. Mark Mckee [de-identified] : MRI [de-identified] : 04/14/2023 [TWNoteComboBox1] : 0%

## 2023-05-11 NOTE — DISCUSSION/SUMMARY
[de-identified] : The patient has a right knee probable meniscus tear. MRI to be reviewed by Dr. Wilhelm. We discussed operative and non-operative options at length. Will consider arthroscopic surgery vs. gel injections.

## 2023-06-01 ENCOUNTER — APPOINTMENT (OUTPATIENT)
Dept: ORTHOPEDIC SURGERY | Facility: CLINIC | Age: 63
End: 2023-06-01
Payer: COMMERCIAL

## 2023-06-01 VITALS — BODY MASS INDEX: 31.83 KG/M2 | WEIGHT: 235 LBS | HEIGHT: 72 IN

## 2023-06-01 PROCEDURE — 20611 DRAIN/INJ JOINT/BURSA W/US: CPT

## 2023-06-01 PROCEDURE — 99212 OFFICE O/P EST SF 10 MIN: CPT | Mod: 25

## 2023-06-01 NOTE — ASSESSMENT
[FreeTextEntry1] : The patient is here for his first Euflexxa injection for his right knee. He is relatively unchanged. He reports an increase in pain while golfing that has been consistent since. He uses a brace for athletic activities. He denies specific trauma. He denies paresthesias or numbness. The patient denies pain with ADLs such as walking but does have pain with stairs. He does have pain with golfing and exercise movements. He had an MRI on 5/2/23 and received a CSI for the right knee with no relief. He denies locking or buckling epsiodes. \par \par \par Right knee exam: Well appearing male in no apparent distress. No rashes, scars, or abrasions. Neurovascularly intact. Nontender to palpation. Ranging from 0-125. No deformity. Negative anterior/posterior drawer, - Mcmurrays. - Lachmans. Screening exam of the right hip shows a full, painless ROM.\par \par The patient recevied his first Euflexxa injection for the right knee. He tolerated it well. Ultrasound was used. Patient will return in one week for his second injection. \par \par \par

## 2023-06-01 NOTE — HISTORY OF PRESENT ILLNESS
[Gradual] : gradual [6] : 6 [0] : 0 [Dull/Aching] : dull/aching [Sharp] : sharp [Frequent] : frequent [Ice] : ice [Heat] : heat [Standing] : standing [Full time] : Work status: full time [] : no [FreeTextEntry1] : right knee [FreeTextEntry3] : 4/12/2023 [FreeTextEntry5] : 63 y/o M presents for Euflexxa INJ #1 today. Pt reports pain levels remain the same since last visit.  [de-identified] : 5/11/23 [de-identified] : IDALIA Logan [TWNoteComboBox1] : False

## 2023-06-01 NOTE — PROCEDURE
[Large Joint Injection] : Large joint injection [Right] : of the right [Knee] : knee [Pain] : pain [Inflammation] : inflammation [X-ray evidence of Osteoarthritis on this or prior visit] : x-ray evidence of Osteoarthritis on this or prior visit [Betadine] : betadine [Ethyl Chloride sprayed topically] : ethyl chloride sprayed topically [Sterile technique used] : sterile technique used [Euflexxa(20mg)] : 20mg of Euflexxa [#1] : series #1 [] : Patient tolerated procedure well [Apply ice for 15min out of every hour for the next 12-24 hours as tolerated] : apply ice for 15 minutes out of every hour for the next 12-24 hours as tolerated [Previous OTC use and PT nontherapeutic] : patient has tried OTC's including aspirin, Ibuprofen, Aleve, etc or prescription NSAIDS, and/or exercises at home and/or physical therapy without satisfactory response [Patient had decreased mobility in the joint] : patient had decreased mobility in the joint [Risks, benefits, alternatives discussed / Verbal consent obtained] : the risks benefits, and alternatives have been discussed, and verbal consent was obtained [Altered anatomic landmarks d/t systemic disease] : altered anatomic landmarks d/t systemic disease [All ultrasound images have been permanently captured and stored accordingly in our picture archiving and communication system] : All ultrasound images have been permanently captured and stored accordingly in our picture archiving and communication system [Visualization of the needle and placement of injection was performed without complication] : visualization of the needle and placement of injection was performed without complication

## 2023-06-08 ENCOUNTER — APPOINTMENT (OUTPATIENT)
Dept: ORTHOPEDIC SURGERY | Facility: CLINIC | Age: 63
End: 2023-06-08
Payer: COMMERCIAL

## 2023-06-08 ENCOUNTER — NON-APPOINTMENT (OUTPATIENT)
Age: 63
End: 2023-06-08

## 2023-06-08 PROCEDURE — 99212 OFFICE O/P EST SF 10 MIN: CPT | Mod: 25

## 2023-06-08 PROCEDURE — 20611 DRAIN/INJ JOINT/BURSA W/US: CPT | Mod: RT

## 2023-06-08 NOTE — HISTORY OF PRESENT ILLNESS
[Gradual] : gradual [6] : 6 [0] : 0 [Dull/Aching] : dull/aching [Sharp] : sharp [Frequent] : frequent [Ice] : ice [Heat] : heat [Standing] : standing [Full time] : Work status: full time [] : no [FreeTextEntry1] : right knee [FreeTextEntry3] : 4/12/2023 [FreeTextEntry5] : 61 y/o M presents for Euflexxa INJ #2 today. Pt reports pain levels remain the same since last visit.  [de-identified] : 5/11/23 [de-identified] : IDALIA Logan

## 2023-06-08 NOTE — PROCEDURE
[Large Joint Injection] : Large joint injection [Right] : of the right [Knee] : knee [Pain] : pain [Inflammation] : inflammation [X-ray evidence of Osteoarthritis on this or prior visit] : x-ray evidence of Osteoarthritis on this or prior visit [Betadine] : betadine [Ethyl Chloride sprayed topically] : ethyl chloride sprayed topically [Sterile technique used] : sterile technique used [Euflexxa(20mg)] : 20mg of Euflexxa [#2] : series #2 [] : Patient tolerated procedure well [Apply ice for 15min out of every hour for the next 12-24 hours as tolerated] : apply ice for 15 minutes out of every hour for the next 12-24 hours as tolerated [Previous OTC use and PT nontherapeutic] : patient has tried OTC's including aspirin, Ibuprofen, Aleve, etc or prescription NSAIDS, and/or exercises at home and/or physical therapy without satisfactory response [Patient had decreased mobility in the joint] : patient had decreased mobility in the joint [Risks, benefits, alternatives discussed / Verbal consent obtained] : the risks benefits, and alternatives have been discussed, and verbal consent was obtained [Altered anatomic landmarks d/t systemic disease] : altered anatomic landmarks d/t systemic disease [All ultrasound images have been permanently captured and stored accordingly in our picture archiving and communication system] : All ultrasound images have been permanently captured and stored accordingly in our picture archiving and communication system [Visualization of the needle and placement of injection was performed without complication] : visualization of the needle and placement of injection was performed without complication

## 2023-06-08 NOTE — ASSESSMENT
[FreeTextEntry1] : The patient is here for his secon Euflexxa injection for his right knee. He is relatively unchanged. He reports an increase in pain while golfing that has been consistent since. He uses a brace for athletic activities. He denies specific trauma. He denies paresthesias or numbness. The patient denies pain with ADLs such as walking but does have pain with stairs. He does have pain with golfing and exercise movements. He had an MRI on 5/2/23 and received a CSI for the right knee with no relief. He denies locking or buckling epsiodes. \par \par Right knee exam: Well appearing male in no apparent distress. No rashes, scars, or abrasions. Neurovascularly intact. Nontender to palpation. Ranging from 0-125. No deformity. Negative anterior/posterior drawer, - Mcmurrays. - Lachmans. Screening exam of the right hip shows a full, painless ROM.\par \par The patient recevied his second Euflexxa injection for the right knee. He tolerated it well. Ultrasound was used. Patient will return in one week for his third injection. \par \par \par

## 2023-06-15 ENCOUNTER — APPOINTMENT (OUTPATIENT)
Dept: ORTHOPEDIC SURGERY | Facility: CLINIC | Age: 63
End: 2023-06-15
Payer: COMMERCIAL

## 2023-06-15 VITALS — BODY MASS INDEX: 31.83 KG/M2 | HEIGHT: 72 IN | WEIGHT: 235 LBS

## 2023-06-15 PROCEDURE — 99212 OFFICE O/P EST SF 10 MIN: CPT | Mod: 25

## 2023-06-15 PROCEDURE — 20611 DRAIN/INJ JOINT/BURSA W/US: CPT | Mod: RT

## 2023-06-15 NOTE — PROCEDURE
[Large Joint Injection] : Large joint injection [Right] : of the right [Knee] : knee [Pain] : pain [Inflammation] : inflammation [X-ray evidence of Osteoarthritis on this or prior visit] : x-ray evidence of Osteoarthritis on this or prior visit [Betadine] : betadine [Ethyl Chloride sprayed topically] : ethyl chloride sprayed topically [Sterile technique used] : sterile technique used [Euflexxa(20mg)] : 20mg of Euflexxa [#3] : series #3 [] : Patient tolerated procedure well [Apply ice for 15min out of every hour for the next 12-24 hours as tolerated] : apply ice for 15 minutes out of every hour for the next 12-24 hours as tolerated [Previous OTC use and PT nontherapeutic] : patient has tried OTC's including aspirin, Ibuprofen, Aleve, etc or prescription NSAIDS, and/or exercises at home and/or physical therapy without satisfactory response [Patient had decreased mobility in the joint] : patient had decreased mobility in the joint [Risks, benefits, alternatives discussed / Verbal consent obtained] : the risks benefits, and alternatives have been discussed, and verbal consent was obtained [Altered anatomic landmarks d/t systemic disease] : altered anatomic landmarks d/t systemic disease [All ultrasound images have been permanently captured and stored accordingly in our picture archiving and communication system] : All ultrasound images have been permanently captured and stored accordingly in our picture archiving and communication system [Visualization of the needle and placement of injection was performed without complication] : visualization of the needle and placement of injection was performed without complication

## 2023-06-15 NOTE — HISTORY OF PRESENT ILLNESS
[Gradual] : gradual [6] : 6 [0] : 0 [Dull/Aching] : dull/aching [Sharp] : sharp [Frequent] : frequent [Ice] : ice [Heat] : heat [Standing] : standing [Full time] : Work status: full time [] : no [FreeTextEntry1] : right knee [FreeTextEntry3] : 4/12/2023 [FreeTextEntry5] : 61 y/o M presents for Euflexxa INJ #3 today. Pt reports pain levels remain the same since last visit.  [de-identified] : 6/8/23 [de-identified] : IDALIA Logan [de-identified] : Euflexxa

## 2023-06-15 NOTE — ASSESSMENT
[FreeTextEntry1] : The patient is here for his third Euflexxa injection for his right knee. He is relatively unchanged. He reports an increase in pain while golfing that has been consistent since. He uses a brace for athletic activities. He denies specific trauma. He denies paresthesias or numbness. The patient denies pain with ADLs such as walking but does have pain with stairs. He does have pain with golfing and exercise movements. He had an MRI on 5/2/23 and received a CSI for the right knee with no relief. He denies locking or buckling episodes. \par \par Right knee exam: Well appearing male in no apparent distress. No rashes, scars, or abrasions. Neurovascularly intact. Nontender to palpation. Ranging from 0-125. No deformity. Negative anterior/posterior drawer, - Mcmurrays. - Lachmans. Screening exam of the right hip shows a full, painless ROM.\par \par The patient received his third Euflexxa injection for the right knee. He tolerated it well. Ultrasound was used. Patient will return in one week for his third injection. \par \par \par

## 2023-11-16 ENCOUNTER — APPOINTMENT (OUTPATIENT)
Dept: ORTHOPEDIC SURGERY | Facility: CLINIC | Age: 63
End: 2023-11-16
Payer: COMMERCIAL

## 2023-11-16 VITALS — HEIGHT: 72 IN | WEIGHT: 235 LBS | BODY MASS INDEX: 31.83 KG/M2

## 2023-11-16 DIAGNOSIS — S83.209A UNSPECIFIED TEAR OF UNSPECIFIED MENISCUS, CURRENT INJURY, UNSPECIFIED KNEE, INITIAL ENCOUNTER: ICD-10-CM

## 2023-11-16 DIAGNOSIS — M17.11 UNILATERAL PRIMARY OSTEOARTHRITIS, RIGHT KNEE: ICD-10-CM

## 2023-11-16 PROCEDURE — 99213 OFFICE O/P EST LOW 20 MIN: CPT

## 2023-11-17 PROBLEM — M17.11 OSTEOARTHRITIS OF RIGHT KNEE: Status: ACTIVE | Noted: 2023-06-01

## 2023-11-17 PROBLEM — S83.209A TEAR OF MENISCUS, CURRENT: Status: ACTIVE | Noted: 2023-05-11

## 2024-01-12 ENCOUNTER — APPOINTMENT (OUTPATIENT)
Dept: COLORECTAL SURGERY | Facility: CLINIC | Age: 64
End: 2024-01-12

## 2024-03-11 ENCOUNTER — APPOINTMENT (OUTPATIENT)
Dept: GASTROENTEROLOGY | Facility: CLINIC | Age: 64
End: 2024-03-11
Payer: COMMERCIAL

## 2024-03-11 VITALS
BODY MASS INDEX: 31.83 KG/M2 | WEIGHT: 235 LBS | HEART RATE: 65 BPM | SYSTOLIC BLOOD PRESSURE: 152 MMHG | OXYGEN SATURATION: 99 % | DIASTOLIC BLOOD PRESSURE: 92 MMHG | HEIGHT: 72 IN

## 2024-03-11 PROCEDURE — 99204 OFFICE O/P NEW MOD 45 MIN: CPT

## 2024-03-14 NOTE — ASSESSMENT
[FreeTextEntry1] : Mr. Etienne is a 63-year-old man with anal pruritis.  It appears his symptoms started following a course of antibiotics for a UTI.  I suspect some alteration in his bowel jose francisco may have led to these symptoms and have reassured him that he is likely to improve with conservative therapy. It appears that his bowel habits have been recently altered and he may have strained with defecation at times.   Recommendations: MiraLAX daily titrated to soft but well-formed stool consistency.   Counseled patient on improving anal hygiene to clean well after bowel movements without excessive wiping which may lead to irritation and to clean the area with an unscented baby wipe. He had limited relief with hydrocortisone cream.  Trial of Zinc Oxide. Flexible sigmoidoscopy if symptoms do not improve.

## 2024-03-14 NOTE — HISTORY OF PRESENT ILLNESS
[FreeTextEntry1] : Mr Etienne is a 63-year-old man with history of HTN and hyperlipidemia presenting for evaluation of moderate to severe rectal itching.  He was in his usual state of health until December 3, 2023, when he felt symptoms of a UTI. He was evaluated at a local urgent care facility and prescribed an antibiotic.  Shortly following starting the antibiotics he experienced a burning sensation localized to his rectum/anus. When symptoms persisted, he went to a urologist to rule out prostatitis as a cause for his symptoms. CT scan dated 12/14/23 that did not report prostatitis. He was prescribed a topical antifungal cream on 1/4/24 for his symptoms of anal burning with defecation and Alfuzosin (BPH) on 1/12/24   A perianal swab culture dated 1/17/24 reported moderate growth of Citrobacter freundii. Due to the persistent anal burning with bowel movements, he was seen by a CRS who examined him and per patient he did not find a cause for his discomfort. He ordered a topical ointment nystatin-triamcinolone for him on 1/30/24 to apply for 2 weeks. Other medications prescribed include phenazopyridine 200 mg, Sulfamethoxazole/Trimethoprim (Bactrim) and Levofloxacin   Last colonoscopy was 8/30/2022, report not available at time of office visit. Biopsies of right colon left colon and sigmoid colon report colonic mucosa within normal limits. He denies any family history of colon cancer.

## 2024-03-14 NOTE — PHYSICAL EXAM
[Alert] : alert [Sclera] : the sclera and conjunctiva were normal [No Acute Distress] : no acute distress [Normal Appearance] : the appearance of the neck was normal [No Respiratory Distress] : no respiratory distress [Auscultation Breath Sounds / Voice Sounds] : lungs were clear to auscultation bilaterally [Abdomen Tenderness] : non-tender [Bowel Sounds] : normal bowel sounds [Abdomen Soft] : soft [Normal Color / Pigmentation] : normal skin color and pigmentation [Abnormal Walk] : normal gait [No Focal Deficits] : no focal deficits [Normal Affect] : the affect was normal [Normal Mood] : the mood was normal [Normal Sphincter Tone] : normal sphincter tone [de-identified] : No fissure noted on visual inspection.  Perianal erythema. No palpable mass.  (+) internal hemorrhoids with scant stool in anal canal.

## 2024-03-14 NOTE — REVIEW OF SYSTEMS
[Fever] : no fever [Chills] : no chills [Chest Pain] : no chest pain [Shortness Of Breath] : no shortness of breath [FreeTextEntry7] : Rectal burning with bowel movements  [FreeTextEntry8] : recent UTI symptoms

## 2024-03-25 ENCOUNTER — NON-APPOINTMENT (OUTPATIENT)
Age: 64
End: 2024-03-25

## 2024-03-25 DIAGNOSIS — R20.8 OTHER DISTURBANCES OF SKIN SENSATION: ICD-10-CM

## 2024-03-25 RX ORDER — SODIUM PHOSPHATE, DIBASIC AND SODIUM PHOSPHATE, MONOBASIC 7; 19 G/230ML; G/230ML
ENEMA RECTAL
Qty: 2 | Refills: 0 | Status: ACTIVE | COMMUNITY
Start: 2024-03-25 | End: 1900-01-01

## 2024-03-26 ENCOUNTER — APPOINTMENT (OUTPATIENT)
Dept: GASTROENTEROLOGY | Facility: HOSPITAL | Age: 64
End: 2024-03-26

## 2024-03-26 ENCOUNTER — OUTPATIENT (OUTPATIENT)
Dept: OUTPATIENT SERVICES | Facility: HOSPITAL | Age: 64
LOS: 1 days | End: 2024-03-26
Payer: COMMERCIAL

## 2024-03-26 ENCOUNTER — TRANSCRIPTION ENCOUNTER (OUTPATIENT)
Age: 64
End: 2024-03-26

## 2024-03-26 VITALS
WEIGHT: 229.94 LBS | OXYGEN SATURATION: 100 % | DIASTOLIC BLOOD PRESSURE: 86 MMHG | SYSTOLIC BLOOD PRESSURE: 175 MMHG | HEART RATE: 54 BPM | HEIGHT: 72 IN | RESPIRATION RATE: 25 BRPM

## 2024-03-26 VITALS
SYSTOLIC BLOOD PRESSURE: 137 MMHG | HEART RATE: 56 BPM | OXYGEN SATURATION: 100 % | DIASTOLIC BLOOD PRESSURE: 83 MMHG | RESPIRATION RATE: 22 BRPM

## 2024-03-26 DIAGNOSIS — Z90.49 ACQUIRED ABSENCE OF OTHER SPECIFIED PARTS OF DIGESTIVE TRACT: Chronic | ICD-10-CM

## 2024-03-26 DIAGNOSIS — R20.8 OTHER DISTURBANCES OF SKIN SENSATION: ICD-10-CM

## 2024-03-26 PROCEDURE — 45330 DIAGNOSTIC SIGMOIDOSCOPY: CPT

## 2024-03-26 RX ORDER — AMLODIPINE BESYLATE 2.5 MG/1
1 TABLET ORAL
Refills: 0 | DISCHARGE

## 2024-03-26 RX ORDER — AZILSARTAN KAMEDOXOMIL 40 MG/1
1 TABLET ORAL
Refills: 0 | DISCHARGE

## 2024-03-26 RX ORDER — SODIUM CHLORIDE 9 MG/ML
500 INJECTION INTRAMUSCULAR; INTRAVENOUS; SUBCUTANEOUS
Refills: 0 | Status: COMPLETED | OUTPATIENT
Start: 2024-03-26 | End: 2024-03-26

## 2024-03-26 RX ORDER — CARVEDILOL PHOSPHATE 80 MG/1
1 CAPSULE, EXTENDED RELEASE ORAL
Refills: 0 | DISCHARGE

## 2024-03-26 RX ADMIN — SODIUM CHLORIDE 30 MILLILITER(S): 9 INJECTION INTRAMUSCULAR; INTRAVENOUS; SUBCUTANEOUS at 15:55

## 2024-03-26 NOTE — ASU PATIENT PROFILE, ADULT - ABILITY TO HEAR (WITH HEARING AID OR HEARING APPLIANCE IF NORMALLY USED):
left ear [place]/Mildly to Moderately Impaired: difficulty hearing in some environments or speaker may need to increase volume or speak distinctly

## 2024-03-26 NOTE — ASU PATIENT PROFILE, ADULT - FALL HARM RISK - UNIVERSAL INTERVENTIONS
PHYSICAL EXAM:    Vital Signs Last 24 Hrs  T(C): 36.9 (22 Jan 2018 10:36), Max: 37.2 (22 Jan 2018 08:47)  T(F): 98.5 (22 Jan 2018 10:36), Max: 98.9 (22 Jan 2018 08:47)  HR: 76 (22 Jan 2018 10:36) (72 - 76)  BP: 112/62 (22 Jan 2018 10:36) (99/57 - 122/66)  BP(mean): --  RR: 19 (22 Jan 2018 10:36) (19 - 20)  SpO2: 92% (22 Jan 2018 10:36) (91% - 92%)    GENERAL: Pt lying comfortably, NAD.  ENMT: PERRL, +EOMI.  NECK: soft, Supple, No JVD,   CHEST/LUNG: Clear to auscultation bilaterally; No wheezing.  HEART: S1S2+, Regular rate and rhythm; No murmurs.  ABDOMEN: Soft, Nontender, Nondistended; Bowel sounds present.  MUSCULOSKELETAL: Normal range of motion.  SKIN: No rashes or lesions.  NEURO: Awake but disoriented, moves all 4 extremities.   PSYCH: normal mood.
Bed in lowest position, wheels locked, appropriate side rails in place/Call bell, personal items and telephone in reach/Instruct patient to call for assistance before getting out of bed or chair/Non-slip footwear when patient is out of bed/[place] to call system/Physically safe environment - no spills, clutter or unnecessary equipment/Purposeful Proactive Rounding/Room/bathroom lighting operational, light cord in reach

## 2024-03-26 NOTE — ASU PATIENT PROFILE, ADULT - PRO MENTAL HEALTH SX RECENT
Starting cholesterol medications ("statins") could be discussed to reduce your 10 year cardiac/stroke risk if you are willing. An option could be generic lipitor (atorvastatin) 10mg daily, if you decide. Please do your best with diet and exercise to avoid having to start medications for Diabetes. We can recheck the A1c (average sugar) level in 3 months.
none

## 2024-03-26 NOTE — ASU DISCHARGE PLAN (ADULT/PEDIATRIC) - NSDISCH_IV_ENDO_ALL_CORE_FT
If your Intravenous (IV) site becomes red, swollen or painful, call your doctor. Detail Level: Simple Render Post-Care Instructions In Note?: yes Post-Care Instructions: I reviewed with the patient in detail post-care instructions. Patient is to wear sunprotection, and avoid picking at any of the treated lesions. Pt may apply Vaseline to crusted or scabbing areas. Medical Necessity Information: It is in your best interest to select a reason for this procedure from the list below. All of these items fulfill various CMS LCD requirements except the new and changing color options. Number Of Freeze-Thaw Cycles: 1 freeze-thaw cycle Consent: The patient's consent was obtained including but not limited to risks of crusting, scabbing, blistering, scarring, darker or lighter pigmentary change, recurrence, incomplete removal and infection. Medical Necessity Clause: This procedure was medically necessary because the lesions that were treated were: Render Note In Bullet Format When Appropriate: No

## 2024-03-26 NOTE — PRE PROCEDURE NOTE - PRE PROCEDURE EVALUATION
Attending Physician:   Wily                        Procedure: sigmoidoscopy    Indication for Procedure: rectal pain  ________________________________________________________  PAST MEDICAL & SURGICAL HISTORY:  HTN (hypertension)      Hyperlipidemia      History of cholecystectomy  1999        ALLERGIES:  No Known Allergies    HOME MEDICATIONS:  amLODIPine 5 mg oral tablet: 1 tab(s) orally once a day  carvedilol 25 mg oral tablet: 1 tab(s) orally 2 times a day  Edarbi 80 mg oral tablet: 1 tab(s) orally once a day    AICD/PPM: [ ] yes   [ ] no    PERTINENT LAB DATA:                      PHYSICAL EXAMINATION:    Height (cm): 182.9  Weight (kg): 104.3  BMI (kg/m2): 31.2  BSA (m2): 2.26T(C): --  HR: 54  BP: 175/86  RR: 25  SpO2: 100%    Constitutional: NAD  Neck:  supple  Respiratory: no respiratory stress, no audible wheezes  Cardiovascular: RR  Gastrointestinal: soft, NT/ND  Extremities: No peripheral edema  Neurological: Alert, no focal deficits  Psychiatric: Normal mood, normal affect  Skin: No rashes      COMMENTS:    The patient is a suitable candidate for the planned procedure unless box checked [ ]  No, explain:

## 2025-02-10 ENCOUNTER — APPOINTMENT (OUTPATIENT)
Facility: CLINIC | Age: 65
End: 2025-02-10
Payer: COMMERCIAL

## 2025-02-10 VITALS — WEIGHT: 225 LBS | HEIGHT: 72 IN | BODY MASS INDEX: 30.48 KG/M2

## 2025-02-10 DIAGNOSIS — M17.11 UNILATERAL PRIMARY OSTEOARTHRITIS, RIGHT KNEE: ICD-10-CM

## 2025-02-10 PROCEDURE — 99213 OFFICE O/P EST LOW 20 MIN: CPT

## 2025-02-10 PROCEDURE — 73564 X-RAY EXAM KNEE 4 OR MORE: CPT | Mod: RT

## (undated) DEVICE — FOLEY HOLDER STATLOCK 2 WAY ADULT

## (undated) DEVICE — TUBING IV SET GRAVITY 3Y 100" MACRO

## (undated) DEVICE — CATH IV SAFE BC 20G X 1.16" (PINK)

## (undated) DEVICE — Device

## (undated) DEVICE — SUCTION YANKAUER NO CONTROL VENT

## (undated) DEVICE — SENSOR O2 FINGER ADULT

## (undated) DEVICE — SOL INJ NS 0.9% 500ML 2 PORT

## (undated) DEVICE — TUBING CAP SET ENDO 24HR USE GI

## (undated) DEVICE — TUBING SUCTION 20FT

## (undated) DEVICE — PACK IV START WITH CHG

## (undated) DEVICE — BIOPSY FORCEP RADIAL JAW 4 STANDARD WITH NEEDLE

## (undated) DEVICE — TUBING SUCTION CONN 6FT STERILE

## (undated) DEVICE — CATH IV SAFE BC 22G X 1" (BLUE)